# Patient Record
Sex: FEMALE | Race: BLACK OR AFRICAN AMERICAN | Employment: PART TIME | URBAN - METROPOLITAN AREA
[De-identification: names, ages, dates, MRNs, and addresses within clinical notes are randomized per-mention and may not be internally consistent; named-entity substitution may affect disease eponyms.]

---

## 2019-03-08 ENCOUNTER — HOSPITAL ENCOUNTER (EMERGENCY)
Age: 29
Discharge: HOME | End: 2019-03-08
Attending: EMERGENCY MEDICINE
Payer: COMMERCIAL

## 2019-03-08 VITALS
RESPIRATION RATE: 16 BRPM | TEMPERATURE: 98.1 F | DIASTOLIC BLOOD PRESSURE: 83 MMHG | SYSTOLIC BLOOD PRESSURE: 143 MMHG | OXYGEN SATURATION: 100 % | HEART RATE: 86 BPM

## 2019-03-08 DIAGNOSIS — J03.90 ACUTE TONSILLITIS, UNSPECIFIED ETIOLOGY: Primary | ICD-10-CM

## 2019-03-08 DIAGNOSIS — R03.0 ELEVATED BLOOD PRESSURE READING: ICD-10-CM

## 2019-03-08 DIAGNOSIS — R13.10 DYSPHAGIA, UNSPECIFIED TYPE: ICD-10-CM

## 2019-03-08 PROCEDURE — 96365 THER/PROPH/DIAG IV INF INIT: CPT

## 2019-03-08 PROCEDURE — 99284 EMERGENCY DEPT VISIT MOD MDM: CPT

## 2019-03-08 PROCEDURE — 74011250636 HC RX REV CODE- 250/636: Performed by: PHYSICIAN ASSISTANT

## 2019-03-08 PROCEDURE — 96375 TX/PRO/DX INJ NEW DRUG ADDON: CPT

## 2019-03-08 RX ORDER — CLINDAMYCIN HYDROCHLORIDE 300 MG/1
300 CAPSULE ORAL 3 TIMES DAILY
Qty: 30 CAP | Refills: 0 | Status: SHIPPED | OUTPATIENT
Start: 2019-03-08 | End: 2019-03-18

## 2019-03-08 RX ORDER — CLINDAMYCIN PHOSPHATE 600 MG/50ML
600 INJECTION INTRAVENOUS
Status: COMPLETED | OUTPATIENT
Start: 2019-03-08 | End: 2019-03-08

## 2019-03-08 RX ORDER — DEXAMETHASONE SODIUM PHOSPHATE 4 MG/ML
12 INJECTION, SOLUTION INTRA-ARTICULAR; INTRALESIONAL; INTRAMUSCULAR; INTRAVENOUS; SOFT TISSUE
Status: COMPLETED | OUTPATIENT
Start: 2019-03-08 | End: 2019-03-08

## 2019-03-08 RX ORDER — PREDNISONE 20 MG/1
20 TABLET ORAL DAILY
Qty: 7 TAB | Refills: 0 | Status: SHIPPED | OUTPATIENT
Start: 2019-03-08 | End: 2019-03-15

## 2019-03-08 RX ORDER — CLINDAMYCIN PHOSPHATE 300 MG/50ML
300 INJECTION INTRAVENOUS
Status: DISCONTINUED | OUTPATIENT
Start: 2019-03-08 | End: 2019-03-08

## 2019-03-08 RX ADMIN — CLINDAMYCIN PHOSPHATE 600 MG: 12 INJECTION, SOLUTION INTRAVENOUS at 22:46

## 2019-03-08 RX ADMIN — DEXAMETHASONE SODIUM PHOSPHATE 12 MG: 4 INJECTION, SOLUTION INTRAMUSCULAR; INTRAVENOUS at 22:32

## 2019-03-08 NOTE — LETTER
[facility] EMERGENCY DEPT 
[address] [facility] [id number] 
[phone] Work/School Note Date: 3/8/2019 To Whom It May concern: 
 
Kacy Caraballo was seen and treated today in the emergency room by the following provider(s): 
Attending Provider: Carlos Washington MD 
Physician Assistant: Melony Viera PA-C. Kacy Caraballo may return to work on 3/11/2019 or sooner if symptoms markedly improve. Sincerely, Kimi Camarillo PA-C

## 2019-03-09 NOTE — ED PROVIDER NOTES
EMERGENCY DEPARTMENT HISTORY AND PHYSICAL EXAM    Date: 3/8/2019  Patient Name: Sheryl Brown    History of Presenting Illness     Chief Complaint   Patient presents with    Sore Throat         History Provided By: Patient    Chief Complaint: sore throat  Duration: 1 Weeks  Timing:  Gradual and Worsening  Location: ENT  Quality: Aching, Tightness and sore  Severity: 7 out of 10  Modifying Factors: none  Associated Symptoms: difficulty and pain with swallowing      HPI: Sheryl Brown is a 33 y.o. female with a PMH of No significant past medical history who presents to the ER c/o worsening sore throat. Patient states her symptoms began about 1 week prior. She has been taking ibuprofen for her symptoms with moderate relief. She denied any concern for pregnancy; LMP was 1 week ago. Reports fever last week, but has since subsided. Pain with swallowing liquids and food. Denied all other symptoms or complaints. PCP: None    Current Outpatient Medications   Medication Sig Dispense Refill    clindamycin (CLEOCIN) 300 mg capsule Take 1 Cap by mouth three (3) times daily for 10 days. 30 Cap 0    predniSONE (DELTASONE) 20 mg tablet Take 20 mg by mouth daily for 7 days. With Breakfast 7 Tab 0       Past History     Past Medical History:  No past medical history on file. Past Surgical History:  No past surgical history on file. Family History:  No family history on file. Social History:  Social History     Tobacco Use    Smoking status: Not on file   Substance Use Topics    Alcohol use: Not on file    Drug use: Not on file       Allergies: Allergies   Allergen Reactions    Zofran [Ondansetron Hcl] Rash         Review of Systems   Review of Systems   Constitutional: Negative for chills, fatigue and fever. HENT: Positive for sore throat and trouble swallowing. Eyes: Negative. Respiratory: Negative for cough and shortness of breath. Cardiovascular: Negative for chest pain and palpitations. Gastrointestinal: Negative for abdominal pain, nausea and vomiting. Genitourinary: Negative for dysuria. Musculoskeletal: Negative. Skin: Negative. Neurological: Negative for dizziness, weakness, light-headedness and headaches. Psychiatric/Behavioral: Negative. All other systems reviewed and are negative. Physical Exam     Vitals:    03/08/19 2207   BP: (!) 185/103   Pulse: 86   Resp: 16   Temp: 98.1 °F (36.7 °C)   SpO2: 100%     Physical Exam   Constitutional: She is oriented to person, place, and time. She appears well-developed and well-nourished. No distress. HENT:   Head: Normocephalic and atraumatic. Right Ear: Tympanic membrane, external ear and ear canal normal.   Left Ear: Tympanic membrane, external ear and ear canal normal.   Nose: Nose normal.   Mouth/Throat: Uvula is midline and mucous membranes are normal. No trismus in the jaw. No uvula swelling. Oropharyngeal exudate and posterior oropharyngeal erythema present. No posterior oropharyngeal edema or tonsillar abscesses. Tolerating oral secretions w/o difficulty; significant left tonsil swelling noted to midline of oropharynx with exudates noted; no soft tissue or posterior oropharynx swelling noted   Eyes: Conjunctivae are normal. No scleral icterus. Neck: Normal range of motion. Neck supple. No JVD present. No tracheal deviation present. Cardiovascular: Normal rate, regular rhythm and normal heart sounds. No murmur heard. Pulmonary/Chest: Effort normal and breath sounds normal. No respiratory distress. She has no wheezes. She has no rales. She exhibits no tenderness. Abdominal: Soft. There is no tenderness. Musculoskeletal: Normal range of motion. Lymphadenopathy:     She has no cervical adenopathy. Neurological: She is alert and oriented to person, place, and time. She has normal strength. Gait normal. GCS eye subscore is 4. GCS verbal subscore is 5. GCS motor subscore is 6. Skin: Skin is warm and dry.  She is not diaphoretic. Psychiatric: She has a normal mood and affect. Nursing note and vitals reviewed. Diagnostic Study Results     Labs -   No results found for this or any previous visit (from the past 12 hour(s)). Radiologic Studies -   No orders to display     CT Results  (Last 48 hours)    None        CXR Results  (Last 48 hours)    None            Medical Decision Making   I am the first provider for this patient. I reviewed the vital signs, available nursing notes, past medical history, past surgical history, family history and social history. Vital Signs-Reviewed the patient's vital signs. Records Reviewed: Nursing Notes and Old Medical Records     10:37 PM  32 y/o female who presents to the ER c/o sore throat and difficulty swallowing x 1 week. States symptoms began last Saturday. Has been taking ibuprofen for pain with moderate relief. Tolerating oral secretions well. Non-toxic in appearance. No obvious pta on exam.  Significant left tonsil swelling noted. Will plan on empiric  tx for tonsillitis and initiate tx w/ clinda while pt here. Tyron Quick PA-C     11:23 PM  Pt resting with no further complaints at this time. Tolerating sipping on water. Will plan on outpt tx with clinda and short course of prednisone. Will give ENT follow up. Advised motrin as needed for pain relief. Discussed strict return precautions if symptoms worsen, or she cannot tolerate liquids or secretions. All questions answered and patient in agreement with plan of care. Will plan for discharge. Tyron Quick PA-C    Disposition:  discharged    DISCHARGE NOTE:       Care plan outlined and precautions discussed. Patient has no new complaints, changes, or physical findings. Results of n/a were reviewed with the patient. All medications were reviewed with the patient; will d/c home with clinda and prednisone. All of pt's questions and concerns were addressed.  Patient was instructed and agrees to follow up with ENT, as well as to return to the ED upon further deterioration. Patient is ready to go home. Follow-up Information     Follow up With Specialties Details Why [address] [facility] EMERGENCY DEPT Emergency Medicine  If symptoms worsen [address] [address]    Edilma Helm MD Otolaryngology, Surgery Call in 3 days ENT follow up for tonsillitis [address] [id number]  [address] [facility]  [phone]            Current Discharge Medication List      START taking these medications    Details   clindamycin (CLEOCIN) 300 mg capsule Take 1 Cap by mouth three (3) times daily for 10 days. Qty: 30 Cap, Refills: 0      predniSONE (DELTASONE) 20 mg tablet Take 20 mg by mouth daily for 7 days. With Breakfast  Qty: 7 Tab, Refills: 0             Provider Notes (Medical Decision Making):     Procedures:  Procedures        Diagnosis     Clinical Impression:   1. Acute tonsillitis, unspecified etiology    2. Dysphagia, unspecified type    3.  Elevated blood pressure reading

## 2019-03-09 NOTE — ED NOTES
I performed a brief history of the patient here in triage and I have determined that pt will need further treatment and evaluation from the main side ER physician. Dr. Rebekah Franklin and Becky Ellis know about the patient and she is going straight to [facility] IN[facility] room 3.      Visit Vitals  BP (!) 185/103 (BP 1 Location: Right arm, BP Patient Position: At rest)   Pulse 86   Temp 98.1 °F (36.7 °C)   Resp 16   SpO2 100%      PARMJIT Carrillo 10:14 PM

## 2024-04-29 ENCOUNTER — OFFICE VISIT (OUTPATIENT)
Age: 34
End: 2024-04-29

## 2024-04-29 VITALS
WEIGHT: 245 LBS | TEMPERATURE: 98.3 F | BODY MASS INDEX: 36.29 KG/M2 | HEART RATE: 100 BPM | RESPIRATION RATE: 18 BRPM | SYSTOLIC BLOOD PRESSURE: 126 MMHG | HEIGHT: 69 IN | DIASTOLIC BLOOD PRESSURE: 80 MMHG | OXYGEN SATURATION: 100 %

## 2024-04-29 DIAGNOSIS — S96.911A STRAIN OF RIGHT ANKLE, INITIAL ENCOUNTER: Primary | ICD-10-CM

## 2024-04-29 DIAGNOSIS — R03.0 ELEVATED BLOOD PRESSURE READING: ICD-10-CM

## 2024-04-29 RX ORDER — ASPIRIN 81 MG/1
81 TABLET, CHEWABLE ORAL DAILY
COMMUNITY

## 2024-04-29 RX ORDER — ACETAMINOPHEN 160 MG
2000 TABLET,DISINTEGRATING ORAL DAILY
COMMUNITY

## 2024-04-29 RX ORDER — DILTIAZEM HYDROCHLORIDE 120 MG/1
120 CAPSULE, EXTENDED RELEASE ORAL 2 TIMES DAILY
COMMUNITY

## 2024-04-29 ASSESSMENT — ENCOUNTER SYMPTOMS
GASTROINTESTINAL NEGATIVE: 1
RESPIRATORY NEGATIVE: 1
ALLERGIC/IMMUNOLOGIC NEGATIVE: 1
EYES NEGATIVE: 1